# Patient Record
Sex: MALE | URBAN - METROPOLITAN AREA
[De-identification: names, ages, dates, MRNs, and addresses within clinical notes are randomized per-mention and may not be internally consistent; named-entity substitution may affect disease eponyms.]

---

## 2018-06-20 ENCOUNTER — IMPORTED ENCOUNTER (OUTPATIENT)
Dept: URBAN - METROPOLITAN AREA CLINIC 1 | Facility: CLINIC | Age: 23
End: 2018-06-20

## 2018-06-20 PROBLEM — H52.13: Noted: 2018-06-20

## 2018-06-20 PROCEDURE — S0621 ROUTINE OPHTHALMOLOGICAL EXA: HCPCS

## 2018-06-20 NOTE — PATIENT DISCUSSION
1. Myopia OU -- Finalized Glasses MRx was given to patient for correction if indicated and requestedCTL Trials given to patient. Return for an appointment in [address] [place] [address] with Dr. Page King.

## 2018-06-27 ENCOUNTER — IMPORTED ENCOUNTER (OUTPATIENT)
Dept: URBAN - METROPOLITAN AREA CLINIC 1 | Facility: CLINIC | Age: 23
End: 2018-06-27

## 2018-06-27 NOTE — PATIENT DISCUSSION
1.  CC today - Patient would like to say with [facility]. Changes made and finalized CTL RXReturn for an appointment for Return as scheduled with Dr. Darnell Osorio.

## 2022-04-02 ASSESSMENT — VISUAL ACUITY
OD_SC: 20/30
OS_SC: 20/30
OS_SC: 20/20-2
OD_SC: 20/25-1
OS_CC: J1+
OD_CC: J1+

## 2022-04-02 ASSESSMENT — TONOMETRY
OS_IOP_MMHG: 17
OD_IOP_MMHG: 16